# Patient Record
Sex: FEMALE | Race: WHITE | NOT HISPANIC OR LATINO | Employment: FULL TIME | URBAN - METROPOLITAN AREA
[De-identification: names, ages, dates, MRNs, and addresses within clinical notes are randomized per-mention and may not be internally consistent; named-entity substitution may affect disease eponyms.]

---

## 2023-05-22 ENCOUNTER — TELEPHONE (OUTPATIENT)
Dept: PRIMARY CARE | Facility: CLINIC | Age: 23
End: 2023-05-22
Payer: COMMERCIAL

## 2023-05-22 NOTE — TELEPHONE ENCOUNTER
Returned patient voicemail added her to the schedule 5.23.23 for med refill of lexapro till she can see her physiatrist in 2 month

## 2023-05-23 ENCOUNTER — OFFICE VISIT (OUTPATIENT)
Dept: PRIMARY CARE | Facility: CLINIC | Age: 23
End: 2023-05-23
Payer: COMMERCIAL

## 2023-05-23 VITALS
SYSTOLIC BLOOD PRESSURE: 100 MMHG | HEART RATE: 71 BPM | DIASTOLIC BLOOD PRESSURE: 60 MMHG | WEIGHT: 135 LBS | BODY MASS INDEX: 24.84 KG/M2 | OXYGEN SATURATION: 98 % | HEIGHT: 62 IN

## 2023-05-23 DIAGNOSIS — E55.9 VITAMIN D DEFICIENCY: ICD-10-CM

## 2023-05-23 DIAGNOSIS — F41.1 GENERALIZED ANXIETY DISORDER: Primary | ICD-10-CM

## 2023-05-23 DIAGNOSIS — Z00.00 ROUTINE GENERAL MEDICAL EXAMINATION AT A HEALTH CARE FACILITY: ICD-10-CM

## 2023-05-23 PROBLEM — N92.6 IRREGULAR MENSTRUAL CYCLE: Status: ACTIVE | Noted: 2023-05-23

## 2023-05-23 PROBLEM — F41.0 PANIC ATTACKS: Status: ACTIVE | Noted: 2023-05-23

## 2023-05-23 PROCEDURE — 99395 PREV VISIT EST AGE 18-39: CPT | Performed by: FAMILY MEDICINE

## 2023-05-23 RX ORDER — ESCITALOPRAM OXALATE 10 MG/1
10 TABLET ORAL DAILY
Qty: 90 TABLET | Refills: 3 | Status: SHIPPED | OUTPATIENT
Start: 2023-05-23

## 2023-05-23 RX ORDER — ESCITALOPRAM OXALATE 10 MG/1
10 TABLET ORAL DAILY
COMMUNITY
End: 2023-05-23 | Stop reason: SDUPTHER

## 2023-05-23 ASSESSMENT — ANXIETY QUESTIONNAIRES
6. BECOMING EASILY ANNOYED OR IRRITABLE: NOT AT ALL
2. NOT BEING ABLE TO STOP OR CONTROL WORRYING: SEVERAL DAYS
GAD7 TOTAL SCORE: 5
4. TROUBLE RELAXING: SEVERAL DAYS
1. FEELING NERVOUS, ANXIOUS, OR ON EDGE: SEVERAL DAYS
7. FEELING AFRAID AS IF SOMETHING AWFUL MIGHT HAPPEN: SEVERAL DAYS
IF YOU CHECKED OFF ANY PROBLEMS ON THIS QUESTIONNAIRE, HOW DIFFICULT HAVE THESE PROBLEMS MADE IT FOR YOU TO DO YOUR WORK, TAKE CARE OF THINGS AT HOME, OR GET ALONG WITH OTHER PEOPLE: NOT DIFFICULT AT ALL
3. WORRYING TOO MUCH ABOUT DIFFERENT THINGS: SEVERAL DAYS
5. BEING SO RESTLESS THAT IT IS HARD TO SIT STILL: NOT AT ALL

## 2023-05-23 ASSESSMENT — PATIENT HEALTH QUESTIONNAIRE - PHQ9
4. FEELING TIRED OR HAVING LITTLE ENERGY: NOT AT ALL
SUM OF ALL RESPONSES TO PHQ QUESTIONS 1-9: 2
5. POOR APPETITE OR OVEREATING: NOT AT ALL
8. MOVING OR SPEAKING SO SLOWLY THAT OTHER PEOPLE COULD HAVE NOTICED. OR THE OPPOSITE, BEING SO FIGETY OR RESTLESS THAT YOU HAVE BEEN MOVING AROUND A LOT MORE THAN USUAL: NOT AT ALL
9. THOUGHTS THAT YOU WOULD BE BETTER OFF DEAD, OR OF HURTING YOURSELF: NOT AT ALL
3. TROUBLE FALLING OR STAYING ASLEEP OR SLEEPING TOO MUCH: SEVERAL DAYS
2. FEELING DOWN, DEPRESSED OR HOPELESS: NOT AT ALL
SUM OF ALL RESPONSES TO PHQ9 QUESTIONS 1 AND 2: 0
10. IF YOU CHECKED OFF ANY PROBLEMS, HOW DIFFICULT HAVE THESE PROBLEMS MADE IT FOR YOU TO DO YOUR WORK, TAKE CARE OF THINGS AT HOME, OR GET ALONG WITH OTHER PEOPLE: NOT DIFFICULT AT ALL
1. LITTLE INTEREST OR PLEASURE IN DOING THINGS: NOT AT ALL
6. FEELING BAD ABOUT YOURSELF - OR THAT YOU ARE A FAILURE OR HAVE LET YOURSELF OR YOUR FAMILY DOWN: NOT AT ALL
7. TROUBLE CONCENTRATING ON THINGS, SUCH AS READING THE NEWSPAPER OR WATCHING TELEVISION: SEVERAL DAYS

## 2023-05-23 NOTE — PROGRESS NOTES
"Mansoor Ricks is a 22 y.o. female and is here for a comprehensive physical exam. The patient reports no problems.    Do you take any herbs or supplements that were not prescribed by a doctor? yes iron, women's MVI  Are you taking calcium supplements? no  Are you taking aspirin daily? 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  Menstrual cycles - IUD in place - spotting  Followed by Dr. Mejia MENDOZA History:  LMP: No LMP recorded.  Last pap date: utd with Dr. Ba  Abnormal pap? no  : 0  Para: 0    Review of Systems negative except as noted in HPI and Chief complaint.     Review of Systems     Objective   /60 (BP Location: Left arm, Patient Position: Sitting, BP Cuff Size: Adult)   Pulse 71   Ht 1.575 m (5' 2\")   Wt 61.2 kg (135 lb)   SpO2 98%   BMI 24.69 kg/m²    Physical Exam  Constitutional:       General: She is not in acute distress.     Appearance: Normal appearance.   HENT:      Head: Normocephalic and atraumatic.      Right Ear: Tympanic membrane and ear canal normal.      Left Ear: Tympanic membrane and ear canal normal.      Mouth/Throat:      Mouth: Mucous membranes are moist.   Eyes:      Conjunctiva/sclera: Conjunctivae normal.      Pupils: Pupils are equal, round, and reactive to light.   Neck:      Vascular: No carotid bruit.   Cardiovascular:      Rate and Rhythm: Normal rate and regular rhythm.      Heart sounds: No murmur heard.  Pulmonary:      " Effort: Pulmonary effort is normal.      Breath sounds: Normal breath sounds. No wheezing or rhonchi.   Abdominal:      General: Bowel sounds are normal.      Palpations: Abdomen is soft.   Musculoskeletal:         General: No swelling.      Cervical back: No rigidity.   Lymphadenopathy:      Cervical: No cervical adenopathy.   Skin:     General: Skin is warm and dry.      Findings: No rash.   Neurological:      Mental Status: She is alert.         Assessment/Plan   Healthy female exam.      1. Please have labs drawn at your earliest convenience.  You should fast 12 hours prior to arriving at the lab - during these 12 hours you may have water, black coffee, black tea - nothing with cream or sugar and no solid foods.    Our office will contact you with results after they have been reviewed.  Please allow 48 - 72 hours for most results, some may take longer.  Please keep in mind that results may post immediately to your online portal, even before we have a chance to review them.  Once we have had the opportunity to review we will post comments and have our staff contact you with results and any instructions.  Please call our office if you do not hear from our office in 7 days.     2. Patient Counseling:  --Nutrition: Stressed importance of moderation in sodium/caffeine intake, saturated fat and cholesterol, caloric balance, sufficient intake of fresh fruits, vegetables, fiber, calcium, iron, and 1 mg of folate supplement per day (for females capable of pregnancy).  --Discussed the issue of estrogen replacement, calcium supplement, and the daily use of baby aspirin.  --Exercise: Stressed the importance of regular exercise.   --Substance Abuse: Discussed cessation/primary prevention of tobacco, alcohol, or other drug use; driving or other dangerous activities under the influence; availability of treatment for abuse.    --Sexuality: Discussed sexually transmitted diseases, partner selection, use of condoms, avoidance of  unintended pregnancy  and contraceptive alternatives.   --Injury prevention: Discussed safety belts, safety helmets, smoke detector, smoking near bedding or upholstery.   --Dental health: Discussed importance of regular tooth brushing, flossing, and dental visits.  --Immunizations reviewed.  --Discussed benefits of screening colonoscopy.  --After hours service discussed with patient  3. Discussed the patient's BMI with her.  The BMI is in the acceptable range.    Refilling Escitalopram at current dosage - call with any worsening of symptoms.    Follow up next physical in 1 year

## 2024-02-06 ENCOUNTER — TELEMEDICINE (OUTPATIENT)
Dept: DERMATOLOGY | Facility: CLINIC | Age: 24
End: 2024-02-06
Payer: COMMERCIAL

## 2024-02-06 DIAGNOSIS — L70.0 ACNE VULGARIS: Primary | ICD-10-CM

## 2024-02-06 PROCEDURE — 99203 OFFICE O/P NEW LOW 30 MIN: CPT | Performed by: DERMATOLOGY

## 2024-02-06 RX ORDER — BENZOYL PEROXIDE 50 MG/ML
LIQUID TOPICAL DAILY
Qty: 141 G | Refills: 11 | Status: SHIPPED | OUTPATIENT
Start: 2024-02-06 | End: 2025-02-05

## 2024-02-06 RX ORDER — TRETINOIN 0.25 MG/G
CREAM TOPICAL NIGHTLY
Qty: 45 G | Refills: 11 | Status: SHIPPED | OUTPATIENT
Start: 2024-02-06 | End: 2025-02-05

## 2024-02-06 RX ORDER — CLINDAMYCIN PHOSPHATE 10 UG/ML
LOTION TOPICAL 2 TIMES DAILY
Qty: 60 ML | Refills: 11 | Status: SHIPPED | OUTPATIENT
Start: 2024-02-06 | End: 2025-02-05

## 2024-02-06 ASSESSMENT — DERMATOLOGY QUALITY OF LIFE (QOL) ASSESSMENT
RATE HOW EMOTIONALLY BOTHERED YOU ARE BY YOUR SKIN PROBLEM (FOR EXAMPLE, WORRY, EMBARRASSMENT, FRUSTRATION): 5
RATE HOW EMOTIONALLY BOTHERED YOU ARE BY YOUR SKIN PROBLEM (FOR EXAMPLE, WORRY, EMBARRASSMENT, FRUSTRATION): 5
RATE HOW BOTHERED YOU ARE BY SYMPTOMS OF YOUR SKIN PROBLEM (EG, ITCHING, STINGING BURNING, HURTING OR SKIN IRRITATION): 5
WHAT SINGLE SKIN CONDITION LISTED BELOW IS THE PATIENT ANSWERING THE QUALITY-OF-LIFE ASSESSMENT QUESTIONS ABOUT: ACNE
RATE HOW BOTHERED YOU ARE BY EFFECTS OF YOUR SKIN PROBLEMS ON YOUR ACTIVITIES (EG, GOING OUT, ACCOMPLISHING WHAT YOU WANT, WORK ACTIVITIES OR YOUR RELATIONSHIPS WITH OTHERS): 3
RATE HOW BOTHERED YOU ARE BY SYMPTOMS OF YOUR SKIN PROBLEM (EG, ITCHING, STINGING BURNING, HURTING OR SKIN IRRITATION): 5
RATE HOW BOTHERED YOU ARE BY EFFECTS OF YOUR SKIN PROBLEMS ON YOUR ACTIVITIES (EG, GOING OUT, ACCOMPLISHING WHAT YOU WANT, WORK ACTIVITIES OR YOUR RELATIONSHIPS WITH OTHERS): 3
WHAT SINGLE SKIN CONDITION LISTED BELOW IS THE PATIENT ANSWERING THE QUALITY-OF-LIFE ASSESSMENT QUESTIONS ABOUT: ACNE

## 2024-02-06 ASSESSMENT — PATIENT GLOBAL ASSESSMENT (PGA): WHAT IS THE PGA: PATIENT GLOBAL ASSESSMENT:  3 - MODERATE

## 2024-02-06 NOTE — PROGRESS NOTES
"Mansoor Ricks \"Quinton\" is a 23 y.o. female who presents for the following: Acne.     Patient presents today for concerns of acne. This is mainly on the chin. More recently, she developed milia on the chin more recently.    Currently using Cerave hydrating cleanser and lotion.     Previously 3 years ago she was on isotretinoin and had to discontinue after 4 months due to staph infection on the face  She has also tried Benzoyl peroxide cleanser.    Acne does not necessarily flare with menstrual cycle.     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     Review of Systems:  No other skin or systemic complaints other than what is documented elsewhere in the note.    The following portions of the chart were reviewed this encounter and updated as appropriate:          Skin Cancer History  No skin cancer on file.      Specialty Problems    None       Objective   Well appearing patient in no apparent distress; mood and affect are within normal limits.    A focused skin examination was performed. All findings within normal limits unless otherwise noted below.    Assessment/Plan   1. Acne vulgaris  Head - Anterior (Face)  Scattered comedones and inflammatory papulopustules on the chin    The chronic and intermittently flaring nature of acne was reviewed with the patient today. Patient advised that acne is multifactorial. Treatment options were reviewed with the patient. Advised that typically takes 2-3 months to see 60-80% improvement and treatments may worsen acne appearance prior to improvement.     Wash face twice daily  Do not spot treat, treat the entire surface area to treat current breakouts and prevent new breakouts    Treatment recommendations:  - Benzoyl peroxide 5% cleanser, use once daily, leave on for 2-3 minutes, rinse with water  - Clindamycin 1% lotion, apply twice daily to affected areas on the face, chest, and back  - Tretinoin 0.025% cream, apply pea-sized amount to face in the " evening. Start every other night and increased to nightly as tolerated  - The importance of sun protection was reviewed: including the use of a broad spectrum sunscreen that protects against both UVA/UVB rays, with ingredients such as Zinc oxide or titanium dioxide.  - Also recommended non-comedogenic make up    Follow up in 6 months    tretinoin (Retin-A) 0.025 % cream - Head - Anterior (Face)  Apply topically once daily at bedtime. Apply pea-sized amount to face    benzoyl peroxide (Advanced Exfoliating Cleanser) 5 % external wash - Head - Anterior (Face)  Apply topically once daily. Apply to face, leave on for 2-3 minutes, rinse with water    clindamycin (Cleocin T) 1 % lotion - Head - Anterior (Face)  Apply topically 2 times a day. Apply to affected areas on face    Related Procedures  Follow Up In Dermatology - Established Patient      Tia Garcia MD   PGY3, Department of Dermatology    I saw and evaluated the patient. I personally obtained the key and critical portions of the history and physical exam or was physically present for key and critical portions performed by the resident/fellow. I reviewed the resident/fellow's documentation and discussed the patient with the resident/fellow. I agree with the resident/fellow's medical decision making as documented in the note.    Giancarlo Jaime MD PhD

## 2024-08-01 DIAGNOSIS — F41.1 GENERALIZED ANXIETY DISORDER: ICD-10-CM

## 2024-08-02 RX ORDER — ESCITALOPRAM OXALATE 10 MG/1
10 TABLET ORAL DAILY
Qty: 30 TABLET | Refills: 10 | Status: SHIPPED | OUTPATIENT
Start: 2024-08-02

## 2024-08-09 ENCOUNTER — APPOINTMENT (OUTPATIENT)
Dept: DERMATOLOGY | Facility: CLINIC | Age: 24
End: 2024-08-09
Payer: COMMERCIAL

## 2024-08-09 DIAGNOSIS — L70.0 ACNE VULGARIS: ICD-10-CM

## 2024-08-09 PROCEDURE — 99214 OFFICE O/P EST MOD 30 MIN: CPT | Performed by: DERMATOLOGY

## 2024-08-09 RX ORDER — CLINDAMYCIN PHOSPHATE 10 UG/ML
LOTION TOPICAL 2 TIMES DAILY
Qty: 60 ML | Refills: 11 | Status: SHIPPED | OUTPATIENT
Start: 2024-08-09 | End: 2025-08-09

## 2024-08-09 RX ORDER — TRETINOIN 0.25 MG/G
CREAM TOPICAL NIGHTLY
Qty: 45 G | Refills: 11 | Status: SHIPPED | OUTPATIENT
Start: 2024-08-09 | End: 2025-08-09

## 2024-08-09 RX ORDER — BENZOYL PEROXIDE 50 MG/ML
LIQUID TOPICAL DAILY
Qty: 141 G | Refills: 11 | Status: SHIPPED | OUTPATIENT
Start: 2024-08-09 | End: 2025-08-09

## 2024-08-09 ASSESSMENT — DERMATOLOGY QUALITY OF LIFE (QOL) ASSESSMENT
RATE HOW BOTHERED YOU ARE BY SYMPTOMS OF YOUR SKIN PROBLEM (EG, ITCHING, STINGING BURNING, HURTING OR SKIN IRRITATION): 1
WHAT SINGLE SKIN CONDITION LISTED BELOW IS THE PATIENT ANSWERING THE QUALITY-OF-LIFE ASSESSMENT QUESTIONS ABOUT: ACNE
RATE HOW EMOTIONALLY BOTHERED YOU ARE BY YOUR SKIN PROBLEM (FOR EXAMPLE, WORRY, EMBARRASSMENT, FRUSTRATION): 1
RATE HOW BOTHERED YOU ARE BY SYMPTOMS OF YOUR SKIN PROBLEM (EG, ITCHING, STINGING BURNING, HURTING OR SKIN IRRITATION): 1
RATE HOW BOTHERED YOU ARE BY EFFECTS OF YOUR SKIN PROBLEMS ON YOUR ACTIVITIES (EG, GOING OUT, ACCOMPLISHING WHAT YOU WANT, WORK ACTIVITIES OR YOUR RELATIONSHIPS WITH OTHERS): 0 - NEVER BOTHERED
DATE THE QUALITY-OF-LIFE ASSESSMENT WAS COMPLETED: 67061
WHAT SINGLE SKIN CONDITION LISTED BELOW IS THE PATIENT ANSWERING THE QUALITY-OF-LIFE ASSESSMENT QUESTIONS ABOUT: ACNE
RATE HOW BOTHERED YOU ARE BY EFFECTS OF YOUR SKIN PROBLEMS ON YOUR ACTIVITIES (EG, GOING OUT, ACCOMPLISHING WHAT YOU WANT, WORK ACTIVITIES OR YOUR RELATIONSHIPS WITH OTHERS): 0 - NEVER BOTHERED
RATE HOW EMOTIONALLY BOTHERED YOU ARE BY YOUR SKIN PROBLEM (FOR EXAMPLE, WORRY, EMBARRASSMENT, FRUSTRATION): 1

## 2024-08-09 NOTE — PROGRESS NOTES
Subjective     John Ricks is a 23 y.o. female who presents for the following: Acne (BPO wash BID, Clindamycin lotion at night, Tretinoin 2-3 times per week at night ).     Last seen 2/2024 for acne, started on BPO 5% wash morning and night, clindamycin 1% lotion at night, tretinoin 0.025% cream two times a week. Not pregnant or currently trying. Flares around her periods. Doing well today with no active lesions.     Review of Systems:  No other skin or systemic complaints other than what is documented elsewhere in the note.    The following portions of the chart were reviewed this encounter and updated as appropriate:       Skin Cancer History  No skin cancer on file.    Specialty Problems    None    Past Medical History:  John Ricks  has a past medical history of Encounter for immunization (02/21/2017), Encounter for immunization (03/09/2017), Encounter for immunization (02/27/2017), Encounter for screening for infections with a predominantly sexual mode of transmission, Herpesviral infection, unspecified (11/04/2019), Other conditions influencing health status (11/10/2017), Personal history of other diseases of the digestive system (07/20/2016), Personal history of other diseases of the female genital tract (10/10/2019), Personal history of other diseases of the female genital tract (03/14/2019), Personal history of other diseases of the respiratory system (11/11/2017), Personal history of other mental and behavioral disorders (11/30/2016), Personal history of other mental and behavioral disorders (01/21/2016), and Personal history of other specified conditions (02/17/2017).    Past Surgical History:  John Ricks  has no past surgical history on file.    Family History:  Patient family history is not on file.    Social History:  John Ricks  reports that she has never smoked. She has never used smokeless tobacco. She reports that she does not use drugs. No history on file for alcohol  use.    Allergies:  Penicillins    Current Medications / CAM's:    Current Outpatient Medications:     escitalopram (Lexapro) 10 mg tablet, TAKE 1 TABLET BY MOUTH EVERY DAY, Disp: 30 tablet, Rfl: 10    benzoyl peroxide (Advanced Exfoliating Cleanser) 5 % external wash, Apply topically once daily. Apply to face, leave on for 2-3 minutes, rinse with water, Disp: 141 g, Rfl: 11    clindamycin (Cleocin T) 1 % lotion, Apply topically 2 times a day. Apply to affected areas on face, Disp: 60 mL, Rfl: 11    tretinoin (Retin-A) 0.025 % cream, Apply topically once daily at bedtime. Apply pea-sized amount to face, Disp: 45 g, Rfl: 11     Objective   Well appearing patient in no apparent distress; mood and affect are within normal limits.    A skin examination was performed including the scalp, face and neck. All findings within normal limits unless otherwise noted below.     Assessment/Plan   1. Acne vulgaris  Head - Anterior (Face)  Scattered comedones and inflammatory papulopustules on the chin    The chronic and intermittently flaring nature of acne was reviewed with the patient today. Patient advised that acne is multifactorial. Treatment options were reviewed with the patient. Advised that typically takes 2-3 months to see 60-80% improvement and treatments may worsen acne appearance prior to improvement.     Wash face twice daily  Do not spot treat, treat the entire surface area to treat current breakouts and prevent new breakouts    Treatment recommendations:  - Benzoyl peroxide 5% cleanser, use once daily QAM, leave on for 2-3 minutes, rinse with water  - Clindamycin 1% lotion, apply twice daily to affected areas on the face, chest, and back  - Tretinoin 0.025% cream, apply pea-sized amount to face in the evening.  - The importance of sun protection was reviewed: including the use of a broad spectrum sunscreen that protects against both UVA/UVB rays, with ingredients such as Zinc oxide or titanium dioxide.  - Also  recommended non-comedogenic make up    Related Procedures  Follow Up In Dermatology - Established Patient    Related Medications  clindamycin (Cleocin T) 1 % lotion  Apply topically 2 times a day. Apply to affected areas on face    benzoyl peroxide (Advanced Exfoliating Cleanser) 5 % external wash  Apply topically once daily. Apply to face, leave on for 2-3 minutes, rinse with water    tretinoin (Retin-A) 0.025 % cream  Apply topically once daily at bedtime. Apply pea-sized amount to face      Darian Pineda,   PGY-4 Dermatology    I saw and evaluated the patient. I personally obtained the key and critical portions of the history and physical exam or was physically present for key and critical portions performed by the resident/fellow. I reviewed the resident/fellow's documentation and discussed the patient with the resident/fellow. I agree with the resident/fellow's medical decision making as documented in the note.    Giancarlo Jaime MD PhD

## 2024-11-01 ENCOUNTER — APPOINTMENT (OUTPATIENT)
Dept: OBSTETRICS AND GYNECOLOGY | Facility: CLINIC | Age: 24
End: 2024-11-01
Payer: COMMERCIAL

## 2024-11-15 ENCOUNTER — APPOINTMENT (OUTPATIENT)
Dept: OBSTETRICS AND GYNECOLOGY | Facility: CLINIC | Age: 24
End: 2024-11-15
Payer: COMMERCIAL

## 2024-11-15 VITALS
SYSTOLIC BLOOD PRESSURE: 114 MMHG | DIASTOLIC BLOOD PRESSURE: 73 MMHG | HEIGHT: 62 IN | BODY MASS INDEX: 25.4 KG/M2 | WEIGHT: 138 LBS | HEART RATE: 73 BPM

## 2024-11-15 DIAGNOSIS — N89.8 VAGINAL DISCHARGE: Primary | ICD-10-CM

## 2024-11-15 DIAGNOSIS — Z01.411 ENCOUNTER FOR GYNECOLOGICAL EXAMINATION WITH ABNORMAL FINDING: ICD-10-CM

## 2024-11-15 PROCEDURE — 87491 CHLMYD TRACH DNA AMP PROBE: CPT

## 2024-11-15 PROCEDURE — 1036F TOBACCO NON-USER: CPT | Performed by: OBSTETRICS & GYNECOLOGY

## 2024-11-15 PROCEDURE — 87591 N.GONORRHOEAE DNA AMP PROB: CPT

## 2024-11-15 PROCEDURE — 87205 SMEAR GRAM STAIN: CPT

## 2024-11-15 PROCEDURE — 99395 PREV VISIT EST AGE 18-39: CPT | Performed by: OBSTETRICS & GYNECOLOGY

## 2024-11-15 PROCEDURE — 3008F BODY MASS INDEX DOCD: CPT | Performed by: OBSTETRICS & GYNECOLOGY

## 2024-11-15 PROCEDURE — 87661 TRICHOMONAS VAGINALIS AMPLIF: CPT

## 2024-11-15 RX ORDER — LEVONORGESTREL 52 MG/1
1 INTRAUTERINE DEVICE INTRAUTERINE ONCE
COMMUNITY

## 2024-11-15 RX ORDER — METRONIDAZOLE 7.5 MG/G
GEL VAGINAL DAILY
Qty: 70 G | Refills: 2 | Status: SHIPPED | OUTPATIENT
Start: 2024-11-15 | End: 2024-11-20

## 2024-11-15 NOTE — PROGRESS NOTES
Subjective   John Ricks is a 24 y.o. female here for a routine exam.  She has noted an increase of vaginal discharge.  She says she is frustrated by frequently treating yeast and BV symptoms.  She has a Mirena IUD that was inserted 2018.  She has no menses, occasional light spotting. She has a history of HSV 1 in 2019, outbreaks once per year.    There is no dysuria or change in bowel habits.  She is occasionally constipated which she manages with MiraLAX.    She has worked as an  in [place] but recently left her job and moved back to [place].    Personal health questionnaire reviewed: yes.     Gynecologic History  Patient's last menstrual period was 10/15/2024 (approximate).  Contraception: IUD  Last Pap: 8/15/22. Results were: normal.    Obstetric History  OB History    Para Term  AB Living   0 0 0 0 0 0   SAB IAB Ectopic Multiple Live Births   0 0 0 0 0       Objective   Constitutional: Alert and in no acute distress. Well developed, well nourished.   Head and Face: Head and face: Normal.    Eyes: Normal external exam - nonicteric sclera, extraocular movements intact (EOMI) and no ptosis.   Neck: No neck asymmetry. Supple. Thyroid not enlarged and there were no palpable thyroid nodules.    Pulmonary: No respiratory distress.   Chest: Breasts: Normal appearance, no nipple discharge and no skin changes. Palpation of breasts and axillae: No palpable mass and no axillary lymphadenopathy.   Abdomen: Soft nontender; no abdominal mass palpated. No organomegaly. No hernias.   Genitourinary: External genitalia: Normal. No inguinal lymphadenopathy. Bartholin's Urethral and Skenes Glands: Normal. Urethra: Normal.  Bladder: Normal on palpation. Vagina: Normal. Cervix: Normal.  Strings at cervical os.  Uterus: Normal.  Right Adnexa/parametria: Normal.  Left Adnexa/parametria: Normal.  Inspection of Perianal Area: Normal.   Musculoskeletal: No joint swelling seen, normal  movements of all extremities.   Skin: Normal skin color and pigmentation, normal skin turgor, and no rash.   Neurologic: Non-focal. Grossly intact.   Psychiatric: Alert and oriented x 3. Affect normal to patient baseline. Mood: Appropriate.  Physical Exam     Assessment/Plan   Healthy female exam.  This is a 24-year-old female with a normal exam.  There is a moderate amount of thin discharge noted.  Cultures were sent for chlamydia, gonorrhea, trichomonas, BV and yeast.  We discussed empirically treating for BV symptoms with metronidazole gel.  For BV symptoms she will use an applicatorful at bedtime for 5 nights.  She may then want to refill and use in half applicator intermittently after spotting or intercourse to reduce BV symptoms.    We discussed over-the-counter options such as probiotics.  She was given information on Malou from [employer]..    The strings of the Mirena IUD are within the cervical os.  It was inserted 2018, it will  in 2026.    I will see her routinely in 1 year.  Contraception: IUD.   Detail Level: Zone

## 2024-11-16 DIAGNOSIS — A56.09 CHLAMYDIAL CERVICITIS: Primary | ICD-10-CM

## 2024-11-16 LAB
C TRACH RRNA SPEC QL NAA+PROBE: POSITIVE
CLUE CELLS VAG LPF-#/AREA: PRESENT /[LPF]
N GONORRHOEA DNA SPEC QL PROBE+SIG AMP: NEGATIVE
NUGENT SCORE: 9
T VAGINALIS RRNA SPEC QL NAA+PROBE: NEGATIVE
YEAST VAG WET PREP-#/AREA: ABNORMAL

## 2024-11-16 RX ORDER — AZITHROMYCIN 500 MG/1
1000 TABLET, FILM COATED ORAL ONCE
Qty: 2 TABLET | Refills: 1 | Status: SHIPPED | OUTPATIENT
Start: 2024-11-16 | End: 2024-11-16

## 2024-11-16 NOTE — RESULT ENCOUNTER NOTE
The testing shows positive chlamydia.  Negative for gonorrhea and trichomonas.  Chlamydia is treated with a one-time dose of azithromycin, this was ordered to your pharmacy.  I do recommend having your partner treated so that you do not get reinfected.

## 2024-11-16 NOTE — RESULT ENCOUNTER NOTE
The testing shows BV, but negative for yeast.  We discussed using metronidazole gel every night for 5 days.  Then you could use a refill intermittently for BV symptoms.

## 2025-05-23 ENCOUNTER — HOSPITAL ENCOUNTER (EMERGENCY)
Facility: HOSPITAL | Age: 25
Discharge: HOME | End: 2025-05-23
Payer: COMMERCIAL

## 2025-05-23 VITALS
RESPIRATION RATE: 20 BRPM | SYSTOLIC BLOOD PRESSURE: 118 MMHG | HEART RATE: 71 BPM | HEIGHT: 62 IN | BODY MASS INDEX: 25.4 KG/M2 | TEMPERATURE: 97.5 F | OXYGEN SATURATION: 99 % | WEIGHT: 138 LBS | DIASTOLIC BLOOD PRESSURE: 75 MMHG

## 2025-05-23 DIAGNOSIS — T19.2XXA FOREIGN BODY IN VAGINA, INITIAL ENCOUNTER: Primary | ICD-10-CM

## 2025-05-23 PROCEDURE — 99283 EMERGENCY DEPT VISIT LOW MDM: CPT

## 2025-05-23 PROCEDURE — 99282 EMERGENCY DEPT VISIT SF MDM: CPT

## 2025-05-23 ASSESSMENT — COLUMBIA-SUICIDE SEVERITY RATING SCALE - C-SSRS
1. IN THE PAST MONTH, HAVE YOU WISHED YOU WERE DEAD OR WISHED YOU COULD GO TO SLEEP AND NOT WAKE UP?: NO
6. HAVE YOU EVER DONE ANYTHING, STARTED TO DO ANYTHING, OR PREPARED TO DO ANYTHING TO END YOUR LIFE?: NO
2. HAVE YOU ACTUALLY HAD ANY THOUGHTS OF KILLING YOURSELF?: NO

## 2025-05-23 ASSESSMENT — PAIN - FUNCTIONAL ASSESSMENT: PAIN_FUNCTIONAL_ASSESSMENT: 0-10

## 2025-05-23 ASSESSMENT — PAIN SCALES - GENERAL: PAINLEVEL_OUTOF10: 0 - NO PAIN

## 2025-05-23 NOTE — ED PROVIDER NOTES
Chief Complaint   Patient presents with    Foreign Body in Vagina     Possible condom stuck in vaginal area     HPI:   John Ricks is an 24 y.o. female with a history of JUANY presenting to the ED for concerns of a foreign body in her vagina.  Explains that a condom got stuck in her vagina during intercourse this evening.  She does report a foreign body sensation.  She explains she did try to get it out herself however was unsuccessful.  She denies any other complaints no abdominal pain NVD chest pain shortness of breath headache dizziness.  No other concerns.  No concerns for STDs.  No issues urinating.    RX Allergies[1]:  Medical History[2]  Surgical History[3]  Family History[4]     Physical Exam  Vitals and nursing note reviewed.   Constitutional:       General: She is not in acute distress.     Appearance: She is well-developed.   HENT:      Head: Normocephalic and atraumatic.   Eyes:      Conjunctiva/sclera: Conjunctivae normal.   Cardiovascular:      Rate and Rhythm: Normal rate and regular rhythm.      Heart sounds: No murmur heard.  Pulmonary:      Effort: Pulmonary effort is normal. No respiratory distress.      Breath sounds: Normal breath sounds.   Abdominal:      Palpations: Abdomen is soft.      Tenderness: There is no abdominal tenderness.   Genitourinary:     General: Normal vulva.      Comments: Condom present in posterior aspect of vaginal canal.  No lesions.  Cervix normal limits  Musculoskeletal:         General: No swelling.      Cervical back: Neck supple.   Skin:     General: Skin is warm and dry.      Capillary Refill: Capillary refill takes less than 2 seconds.   Neurological:      Mental Status: She is alert.   Psychiatric:         Mood and Affect: Mood normal.        Foreign Body Removal - Orifice    Performed by: Kenyon Kilgore PA-C  Authorized by: Kenyon Kilgore PA-C    Consent:     Consent obtained:  Verbal    Consent given by:  Patient    Risks, benefits, and alternatives were  discussed: yes      Risks discussed:  Infection, bleeding and damage to surrounding structures    Alternatives discussed:  No treatment and delayed treatment  Universal protocol:     Patient identity confirmed:  Verbally with patient  Location:     Location:  Vagina  Sedation:     Sedation type:  None  Anesthesia:     Topical anesthetic:  None  Procedure details:     Localization method:  Direct visualization    Removal mechanism:  Manual extraction    Procedure complexity:  Simple    Foreign bodies recovered:  1    Description:  Condom    Intact foreign body removal: yes    Post-procedure details:     Confirmation:  No additional foreign bodies on visualization    Procedure completion:  Tolerated well, no immediate complications        VS: As documented in the triage note and EMR flowsheet from this visit were reviewed.    Medical Decision Making: This is a 24-year-old female presenting to the ED for a foreign body in her vagina.  On my examination her vitals are stable.  She did have a condom in her vagina that I did remove with the speculum.  Exam was otherwise normal limits and she has no other concerns.  She was discharged home-going in stable condition.    Diagnoses as of 05/23/25 0140   Foreign body in vagina, initial encounter     Counseling: Spoke with the patient and discussed today´s findings, in addition to providing specific details for the plan of care and expected course.  Patient was given the opportunity to ask questions.    Discussed return precautions and importance of follow-up.  Advised to follow-up with PCP.  I specifically advised to return to the ED for changing or worsening symptoms, new symptoms, complaint specific precautions, and precautions listed on the discharge paperwork.  Educated on the common potential side effects of medications prescribed.    I advised the patient that the emergency evaluation and treatment provided today doesn't end their need for medical care. It is very  important that they follow-up with their primary care provider or other specialist as instructed.    The plan of care was mutually agreed upon with the patient. The patient and/or family were given the opportunity to ask questions. All questions asked today in the ED were answered to the best of my ability with today's information.    This report was transcribed using voice recognition software.  Every effort was made to ensure accuracy, however, inadvertently computerized transcription errors may be present.         [1]   Allergies  Allergen Reactions    Penicillins Anaphylaxis and Swelling   [2]   Past Medical History:  Diagnosis Date    Encounter for immunization 02/21/2017    Need for malaria vaccination    Encounter for immunization 03/09/2017    Need for immunization against typhoid    Encounter for immunization 02/27/2017    Need for malaria vaccination    Encounter for screening for infections with a predominantly sexual mode of transmission     Screening examination for STD (sexually transmitted disease)    Herpesviral infection, unspecified 11/04/2019    HSV-1 (herpes simplex virus 1) infection    Other conditions influencing health status 11/10/2017    History of cough    Personal history of other diseases of the digestive system 07/20/2016    History of constipation    Personal history of other diseases of the female genital tract 10/10/2019    History of vaginal pruritus    Personal history of other diseases of the female genital tract 03/14/2019    History of vaginitis    Personal history of other diseases of the respiratory system 11/11/2017    History of acute pharyngitis    Personal history of other mental and behavioral disorders 11/30/2016    History of depression    Personal history of other mental and behavioral disorders 01/21/2016    History of anxiety    Personal history of other specified conditions 02/17/2017    History of diarrhea   [3] No past surgical history on file.  [4] No family  history on file.       Kenyon Kilgore PA-C  05/23/25 0142

## 2025-05-23 NOTE — DISCHARGE INSTRUCTIONS
You have been seen at a [facility] [place].  Please follow-up with your primary care provider in the next 1 to 2 days for further evaluation and routine follow-up.  Please return to the emergency room if having any worsening symptoms.  Please follow-up with any specialists if discussed during your emergency room stay.

## 2025-05-26 ASSESSMENT — ENCOUNTER SYMPTOMS
BACK PAIN: 0
NAUSEA: 0
DYSURIA: 0
ANOREXIA: 0
SORE THROAT: 0
VOMITING: 0
HEADACHES: 0
FEVER: 0
CHILLS: 0
FLANK PAIN: 0
FREQUENCY: 0
ABDOMINAL PAIN: 0
DIARRHEA: 0
HEMATURIA: 0
CONSTIPATION: 1

## 2025-05-27 ENCOUNTER — APPOINTMENT (OUTPATIENT)
Facility: CLINIC | Age: 25
End: 2025-05-27
Payer: COMMERCIAL

## 2025-05-27 VITALS
WEIGHT: 144 LBS | SYSTOLIC BLOOD PRESSURE: 120 MMHG | DIASTOLIC BLOOD PRESSURE: 81 MMHG | BODY MASS INDEX: 26.34 KG/M2 | HEART RATE: 76 BPM

## 2025-05-27 DIAGNOSIS — Z11.3 SCREENING EXAMINATION FOR STI: Primary | ICD-10-CM

## 2025-05-27 DIAGNOSIS — Z30.41 ENCOUNTER FOR SURVEILLANCE OF CONTRACEPTIVE PILLS: ICD-10-CM

## 2025-05-27 DIAGNOSIS — N89.8 VAGINAL DISCHARGE: ICD-10-CM

## 2025-05-27 DIAGNOSIS — Z30.432 ENCOUNTER FOR IUD REMOVAL: ICD-10-CM

## 2025-05-27 PROCEDURE — 58301 REMOVE INTRAUTERINE DEVICE: CPT | Performed by: OBSTETRICS & GYNECOLOGY

## 2025-05-27 PROCEDURE — 99214 OFFICE O/P EST MOD 30 MIN: CPT | Performed by: OBSTETRICS & GYNECOLOGY

## 2025-05-27 RX ORDER — NORGESTIMATE AND ETHINYL ESTRADIOL 7DAYSX3 28
1 KIT ORAL DAILY
Qty: 90 TABLET | Refills: 3 | Status: SHIPPED | OUTPATIENT
Start: 2025-05-27 | End: 2026-05-27

## 2025-05-27 NOTE — PROGRESS NOTES
Patient ID: John Ricks is a 24 y.o. female here for removal of Mirena IUD that was inserted 2018.  We discussed recurrent discharge and a swab was sent for BV, yeast, Ureaplasma, chlamydia, gonorrhea and trichomonas.  She would like to resume Ortho Tri-Cyclen for contraception.    IUD Management    Performed by: Leonarda Ba MD  Authorized by: Leonarda Ba MD    Procedure: IUD removal    Consent obtained by patient, parent, or legal power of  - including discussion of procedure risks and benefits, patient questions answered, and patient education provided: yes    Reason for removal: patient request    Strings visualized: no    Tenaculum applied to cervix: no    Cervix manually dilated: no    IUD grasped by forceps: yes    IUD removed: yes    Date/Time of Removal:  2025 2:51 PM  Removed without complications: yes    IUD intact: yes    Removal comments: This is a 24-year-old female who comes today for removal of Mirena IUD that was inserted 2018.  She is aware it will not  until 2026 as she is frustrated with nearly monthly symptoms of BV and yeast.  She was positive for chlamydia in 2025.  She desires removal of the IUD, and will begin birth control pills for contraception.  She currently has intermittent itchiness but no significant symptoms.  No pain, no dysuria, no change in bowel habits.  Ortho Tri-Cyclen was ordered to her pharmacy, I recommend beginning on , 1 tablet a day.  You will not be effective for contraception for at least 2 weeks.  It may take 3 packs to adjust to the pills.  I did also send swabs for chlamydia, gonorrhea, trichomonas, BV, yeast and mycoplasma with Ureaplasma.    Answers submitted by the patient for this visit:  Female Genital Questionnaire (Submitted on 2025)  Chief Complaint: Female genitourinary complaint  genital itching: Yes  genital lesions: No  genital odor: No  genital rash: No  missed menses:  No  pelvic pain: Yes  vaginal bleeding: No  vaginal discharge: No  Chronicity: chronic  Onset: more than 1 year ago  Frequency: constantly  Progression since onset: waxing and waning  Pain severity: mild  Affected side: both  Pregnant now?: No  abdominal pain: No  anorexia: No  back pain: No  chills: No  constipation: Yes  diarrhea: No  discolored urine: No  dysuria: No  fever: No  flank pain: No  frequency: No  headaches: No  hematuria: No  joint pain: No  joint swelling: No  nausea: No  painful intercourse: No  rash: No  sore throat: No  urgency: No  vomiting: No  Aggravated by: activity, intercourse  Sexual activity: sexually active  Partner with STD symptoms: unknown  Birth control: an IUD  Menstrual history: regular

## 2025-05-28 LAB
BV SCORE VAG QL: NORMAL
C TRACH RRNA SPEC QL NAA+PROBE: NOT DETECTED
N GONORRHOEA RRNA SPEC QL NAA+PROBE: NOT DETECTED
QUEST GC CT AMPLIFIED (ALWAYS MESSAGE): NORMAL
T VAGINALIS RRNA SPEC QL NAA+PROBE: NOT DETECTED

## 2025-07-01 ENCOUNTER — APPOINTMENT (OUTPATIENT)
Facility: CLINIC | Age: 25
End: 2025-07-01
Payer: COMMERCIAL

## 2025-09-22 ENCOUNTER — APPOINTMENT (OUTPATIENT)
Dept: PRIMARY CARE | Facility: CLINIC | Age: 25
End: 2025-09-22
Payer: COMMERCIAL

## 2025-11-21 ENCOUNTER — APPOINTMENT (OUTPATIENT)
Dept: OBSTETRICS AND GYNECOLOGY | Facility: CLINIC | Age: 25
End: 2025-11-21
Payer: COMMERCIAL